# Patient Record
Sex: MALE | Race: WHITE | NOT HISPANIC OR LATINO | URBAN - NONMETROPOLITAN AREA
[De-identification: names, ages, dates, MRNs, and addresses within clinical notes are randomized per-mention and may not be internally consistent; named-entity substitution may affect disease eponyms.]

---

## 2019-06-06 ENCOUNTER — IMPORTED ENCOUNTER (OUTPATIENT)
Dept: URBAN - NONMETROPOLITAN AREA CLINIC 1 | Facility: CLINIC | Age: 34
End: 2019-06-06

## 2019-06-06 PROBLEM — H40.1134: Noted: 2019-06-06

## 2019-06-06 PROBLEM — Z94.7: Noted: 2019-06-06

## 2019-06-06 PROBLEM — H27.02: Noted: 2019-06-06

## 2019-06-06 PROBLEM — H52.4: Noted: 2019-06-06

## 2019-06-06 PROBLEM — Z96.1: Noted: 2019-06-06

## 2019-06-06 PROBLEM — H33.8: Noted: 2019-06-06

## 2019-06-06 PROCEDURE — 99213 OFFICE O/P EST LOW 20 MIN: CPT

## 2019-06-06 PROCEDURE — 92133 CPTRZD OPH DX IMG PST SGM ON: CPT

## 2019-06-06 PROCEDURE — 92083 EXTENDED VISUAL FIELD XM: CPT

## 2019-06-06 NOTE — PATIENT DISCUSSION
Pseudophakia OD - Discussed diagnosis in detail with patient- Patient is stable and doing well with no glare complaint- Patient had cataract removed as a infant- Continue to monitorPOAG OU - Discussed diagnosis in detail with patient- IOP today OD 16 and OS 18- Cup to Disc noted at OD . 6 and OS . 4- OCT done today OD shows Superior and Inferior NFL thinning and OS shows No NFL thinning - VF done today OD shows Fair field wit hInferior and Nasal defect and OS shows Poor field with Superior defect - Continue Cosopt OD BID- Continue Latanoprost QHS OU - Continue to monitorAphakia OS - Discussed diagnosis in detail with patient - Patient is stable at this time - Continue Prednisolone QAM OD - Continue to monitor Partial Corneal Transplant OD - Discussed diagnosis in detail with patient - Partial transplant done by Dr. Shi Huber at [facility] in 2015- Continue to monitor History of Retinal Detachment OD - Discussd diagnosis in detail with patient - 2 Attempts to fix in the past by [facility]- Scleral buckle in 2016- Continue to monitor Myopia OD / Astigmatism OU/ Hyperopia OS / Presbyopia OU- Discussed diagnosis in detail with patient - Patient previously wears a contact in OS- Continue to monitor Patient toshia records today Dr. Deja Oconnor to review records and will update chart at that time with past surgeries Patient will sign a release for My eye doctor records; 's Notes: MR 12/4/18DFE 12/4/18VF 6/6/19OCT 6/6/19

## 2019-12-05 ENCOUNTER — IMPORTED ENCOUNTER (OUTPATIENT)
Dept: URBAN - NONMETROPOLITAN AREA CLINIC 1 | Facility: CLINIC | Age: 34
End: 2019-12-05

## 2019-12-05 PROBLEM — H52.4: Noted: 2019-12-05

## 2019-12-05 PROBLEM — Z94.7: Noted: 2019-12-05

## 2019-12-05 PROBLEM — H27.02: Noted: 2019-12-05

## 2019-12-05 PROBLEM — Z96.1: Noted: 2019-12-05

## 2019-12-05 PROBLEM — H40.1134: Noted: 2019-12-05

## 2019-12-05 PROBLEM — H33.8: Noted: 2019-12-05

## 2019-12-05 PROCEDURE — 92015 DETERMINE REFRACTIVE STATE: CPT

## 2019-12-05 PROCEDURE — 92014 COMPRE OPH EXAM EST PT 1/>: CPT

## 2019-12-05 NOTE — PATIENT DISCUSSION
Myopia OD / Astigmatism OU/ Hyperopia OS / Presbyopia OU- Discussed diagnosis in detail with patient - MR done by me today with CL OS in place- New GLRx given recommended update in lenses- Will not update CL until he needs them as he states he still has plenty of them. Will need to get notes from [facility] before proceeding further. - Continue to monitorPseudophakia OD - Discussed diagnosis in detail with patient- Patient is stable and doing well with no glare complaint- Patient had cataract removed as a infant- Continue to monitorPOAG OU - Discussed diagnosis in detail with patient- IOP today OD 16 and OS 18- Cup to Disc noted at OD 0.6 and OS 0.4- OCT done previously OD shows Superior and Inferior NFL thinning and OS shows No NFL thinning - VF done previously OD shows Fair field wit hInferior and Nasal defect and OS shows Poor field with Superior defect - Continue Cosopt OD BID- Continue Latanoprost QHS OU - Continue to monitorAphakia OS - Discussed diagnosis in detail with patient - Patient is stable at this time - Continue Prednisolone QAM OD - Continue to monitor Partial Corneal Transplant OD - Discussed diagnosis in detail with patient - Partial transplant done by Dr. Arely Miller at [facility] in 2015- Continue to monitor History of Retinal Detachment OD - Discussd diagnosis in detail with patient - 2 Attempts to fix in the past by [facility]- Scleral buckle in 2016- Continue to monitor Attempted to get notes from [facility] after his last visit records release has been signed x2 now. I will attempt to call them myself today.; 's Notes: MR 12/5/19 - MR done with CL OS in place by Dr. Eusebio Santos. It must be done this way each visit.  DFE 12/4/18VF 6/6/19OCT 6/6/19

## 2020-06-18 ENCOUNTER — IMPORTED ENCOUNTER (OUTPATIENT)
Dept: URBAN - NONMETROPOLITAN AREA CLINIC 1 | Facility: CLINIC | Age: 35
End: 2020-06-18

## 2020-06-18 PROBLEM — Z96.1: Noted: 2020-06-18

## 2020-06-18 PROBLEM — H33.8: Noted: 2020-06-18

## 2020-06-18 PROBLEM — H27.02: Noted: 2020-06-18

## 2020-06-18 PROBLEM — H52.4: Noted: 2020-06-18

## 2020-06-18 PROBLEM — Z94.7: Noted: 2020-06-18

## 2020-06-18 PROBLEM — H40.1134: Noted: 2020-06-18

## 2020-06-18 PROCEDURE — 92133 CPTRZD OPH DX IMG PST SGM ON: CPT

## 2020-06-18 PROCEDURE — 92083 EXTENDED VISUAL FIELD XM: CPT

## 2020-06-18 PROCEDURE — 99213 OFFICE O/P EST LOW 20 MIN: CPT

## 2020-06-18 NOTE — PATIENT DISCUSSION
Pseudophakia OD - Discussed diagnosis in detail with patient- Patient is stable and doing well with no glare complaint- Patient had cataract removed as a infant- Continue to monitorPOAG OU - Discussed diagnosis in detail with patient- IOP today 16 OU- Cup to Disc noted at OD 0.6 and OS 0.4- OCT done today OD shows Superior NFL thinning and OS shows No NFL thinning - VF done today OD shows Fair field with Borderline Inferior Nasal Step and OS shows Poor field with Nasal Defect  - Continue Cosopt OD BID- Continue Latanoprost QHS OU - Continue to monitorAphakia OS - Discussed diagnosis in detail with patient - Patient is stable at this time - Continue Prednisolone QAM OD - Continue to monitor Partial Corneal Transplant OD - Discussed diagnosis in detail with patient - Partial transplant done by Dr. Veda Redding at [facility] in 2015- Continue to monitor History of Retinal Detachment OD - Discussd diagnosis in detail with patient - 2 Attempts to fix in the past by [facility], [facility]- Scleral buckle in 2016- Continue to monitor ----------------------------previous notes--------------------------Myopia OD / Astigmatism OU/ Hyperopia OS / Presbyopia OU- Discussed diagnosis in detail with patient - MR done by me today with CL OS in place- New GLRx given recommended update in lenses- Will not update CL until he needs them as he states he still has plenty of them. Will need to get notes from [facility] before proceeding further. - Continue to monitorAttempted to get notes from [facility] after his last visit records release has been signed x2 now. I will attempt to call them myself today.; Dr's Notes: MR 12/5/19 - MR done with CL OS in place by Dr. Stu Barcenas. It must be done this way each visit.  DFE 12/4/18VF 6/18/20OCT 6/18/20

## 2020-11-23 ENCOUNTER — IMPORTED ENCOUNTER (OUTPATIENT)
Dept: URBAN - NONMETROPOLITAN AREA CLINIC 1 | Facility: CLINIC | Age: 35
End: 2020-11-23

## 2020-11-23 PROBLEM — Z94.7: Noted: 2020-11-23

## 2020-11-23 PROBLEM — H33.8: Noted: 2020-11-23

## 2020-11-23 PROBLEM — H52.4: Noted: 2020-11-23

## 2020-11-23 PROBLEM — H27.02: Noted: 2020-11-23

## 2020-11-23 PROBLEM — Z96.1: Noted: 2020-11-23

## 2020-11-23 PROBLEM — H40.1134: Noted: 2020-11-23

## 2020-11-23 PROBLEM — H18.11: Noted: 2020-11-23

## 2020-11-23 PROCEDURE — 99213 OFFICE O/P EST LOW 20 MIN: CPT

## 2020-11-23 NOTE — PATIENT DISCUSSION
NOTE:  Computer system was not working at time of patient's initial evaluation information put into the system @12:39pm once system was up and running properly. Keratitis with Bollus Keratopathy OD:-  Discussed findings w/ pt today-  Hx of corneal transplant and scleral buckle OD-  No foreign body seen on exam today-  Recommended starting Sofie 128 during the day and ointment QHS-  Recommended he increase Prednisolone to BID OD-  RTC 1 week for recheck ---------------- NOTES BELOW FROM LAST VISIT -----------------------------Pseudophakia OD - Discussed diagnosis in detail with patient- Patient is stable and doing well with no glare complaint- Patient had cataract removed as a infant- Continue to monitorPOAG OU - Discussed diagnosis in detail with patient- IOP today 16 OU- Cup to Disc noted at OD 0.6 and OS 0.4- OCT done today OD shows Superior NFL thinning and OS shows No NFL thinning - VF done today OD shows Fair field with Borderline Inferior Nasal Step and OS shows Poor field with Nasal Defect  - Continue Cosopt OD BID- Continue Latanoprost QHS OU - Continue to monitorAphakia OS - Discussed diagnosis in detail with patient - Patient is stable at this time - Continue Prednisolone QAM OD - Continue to monitor Partial Corneal Transplant OD - Discussed diagnosis in detail with patient - Partial transplant done by Dr. Bay Juan at [facility] in 2015- Continue to monitor History of Retinal Detachment OD - Discussd diagnosis in detail with patient - 2 Attempts to fix in the past by [facility], [facility]- Scleral buckle in 2016- Continue to monitor Myopia OD / Astigmatism OU/ Hyperopia OS / Presbyopia OU- Discussed diagnosis in detail with patient previously- New GLRx given in the past needs to be updated once keratopathy is better controlled - Will not update CL until he needs them as he states he still has plenty of them. Will need to get notes from [facility] if not so already. - Continue to monitorNOTE:  Attempted to get notes from [facility] after his last visit records release has been signed x2 now. I will attempt to call them myself today.; 's Notes: MR 12/5/19 - MR done with CL OS in place by Dr. Navneet Oliveira. It must be done this way each visit.  DFE 12/4/18VF 6/18/20OCT 6/18/20

## 2020-11-30 ENCOUNTER — IMPORTED ENCOUNTER (OUTPATIENT)
Dept: URBAN - NONMETROPOLITAN AREA CLINIC 1 | Facility: CLINIC | Age: 35
End: 2020-11-30

## 2020-11-30 PROCEDURE — 99213 OFFICE O/P EST LOW 20 MIN: CPT

## 2020-11-30 NOTE — PATIENT DISCUSSION
Keratitis with Bollus Keratopathy OD:-  Discussed findings w/ pt today-  Signs/symptoms associated discussed-  Hx of corneal transplant and scleral buckle OD-  Microcystic edema seen worse than last exam-  Note that generic Cosopt bottle has changed per patient unsure if same  as previous. Sample of Cosopt PF given today for patient to use and d/c generic Cosopt for now. -  Due to changes/worsening Increase Prednisolone OD from BID to QID. -  Continue Sofie 128 ointment during the day d/c ointment QHS-  Start E-mycin ointment QHS Rx will be sent-  Recommend evaluation with Dr. Suresh Schroeder soon especially if he is not better by next visit. -  RTC 1 week for recheck ---------------- NOTES BELOW FROM PREVIOUS VISIT -----------------------------Pseudophakia OD - Discussed diagnosis in detail with patient- Patient is stable and doing well with no glare complaint- Patient had cataract removed as a infant- Continue to monitorPOAG OU - Discussed diagnosis in detail with patient- IOP today 16 OU- Cup to Disc noted at OD 0.6 and OS 0.4- OCT done today OD shows Superior NFL thinning and OS shows No NFL thinning - VF done today OD shows Fair field with Borderline Inferior Nasal Step and OS shows Poor field with Nasal Defect  - Continue Cosopt OD BID- Continue Latanoprost QHS OU - Continue to monitorAphakia OS - Discussed diagnosis in detail with patient - Patient is stable at this time - Continue Prednisolone QAM OD - Continue to monitor Partial Corneal Transplant OD - Discussed diagnosis in detail with patient - Partial transplant done by Dr. Suresh Schroeder at [facility] in 2015- Continue to monitor History of Retinal Detachment OD - Discussd diagnosis in detail with patient - 2 Attempts to fix in the past by [facility], [facility]- Scleral buckle in 2016- Continue to monitor Myopia OD / Astigmatism OU/ Hyperopia OS / Presbyopia OU- Discussed diagnosis in detail with patient previously- New GLRx given in the past needs to be updated once keratopathy is better controlled - Will not update CL until he needs them as he states he still has plenty of them. Will need to get notes from [facility] if not so already. - Continue to monitorNOTE:  Attempted to get notes from [facility] after his last visit records release has been signed x2 now. I will attempt to call them myself today.; Dr's Notes: MR 12/5/19 - MR done with CL OS in place by Dr. Luis Bardales. It <br />must be done this way each visit.  <br />DFE 12/4/18<br />VF 6/18/20<br />OCT 6/18/20<br />

## 2021-02-05 ENCOUNTER — IMPORTED ENCOUNTER (OUTPATIENT)
Dept: URBAN - NONMETROPOLITAN AREA CLINIC 1 | Facility: CLINIC | Age: 36
End: 2021-02-05

## 2021-02-05 PROBLEM — H33.8: Noted: 2021-02-05

## 2021-02-05 PROBLEM — H40.1134: Noted: 2021-02-05

## 2021-02-05 PROBLEM — Z96.1: Noted: 2021-02-05

## 2021-02-05 PROBLEM — H18.11: Noted: 2021-02-05

## 2021-02-05 PROBLEM — H27.02: Noted: 2021-02-05

## 2021-02-05 PROBLEM — Z94.7: Noted: 2021-02-05

## 2021-02-05 PROBLEM — H18.831: Noted: 2021-02-05

## 2021-02-05 PROCEDURE — 99213 OFFICE O/P EST LOW 20 MIN: CPT

## 2021-02-05 NOTE — PATIENT DISCUSSION
Epith defect/erosion OD- Discussed diagnosis in detail with patient.- Start Besivance QID OD samples given today- Recommend BCL OD today; Dailies Total 1 +0.50 given today. - Continue to monitor.- F/U Monday NOTES FROM PREVIOUS EXAM:Keratitis with Bollus Keratopathy OD:-  Discussed findings w/ pt today-  Signs/symptoms associated discussed-  Hx of corneal transplant and scleral buckle OD-  Microcystic edema seen worse than last exam-  Note that generic Cosopt bottle has changed per patient unsure if same  as previous. Sample of Cosopt PF given today for patient to use and d/c generic Cosopt for now. -  Due to changes/worsening Increase Prednisolone OD from BID to QID. -  Continue Sofie 128 ointment during the day d/c ointment QHS-  Start E-mycin ointment QHS Rx will be sent-  Recommend evaluation with Dr. Malinda Bridges soon especially if he is not better by next visit. -  RTC 1 week for recheck Pseudophakia OD - Discussed diagnosis in detail with patient- Patient is stable and doing well with no glare complaint- Patient had cataract removed as a infant- Continue to monitorPOAG OU - Discussed diagnosis in detail with patient- IOP today 16 OU- Cup to Disc noted at OD 0.6 and OS 0.4- OCT done today OD shows Superior NFL thinning and OS shows No NFL thinning - VF done today OD shows Fair field with Borderline Inferior Nasal Step and OS shows Poor field with Nasal Defect  - Continue Cosopt OD BID- Continue Latanoprost QHS OU - Continue to monitorAphakia OS - Discussed diagnosis in detail with patient - Patient is stable at this time - Continue Prednisolone QAM OD - Continue to monitor Partial Corneal Transplant OD - Discussed diagnosis in detail with patient - Partial transplant done by Dr. Malinda Bridges at [facility] in 2015- Continue to monitor History of Retinal Detachment OD - Discussd diagnosis in detail with patient - 2 Attempts to fix in the past by [facility], [facility]- Scleral buckle in 2016- Continue to monitor Myopia OD / Astigmatism OU/ Hyperopia OS / Presbyopia OU- Discussed diagnosis in detail with patient previously- New GLRx given in the past needs to be updated once keratopathy is better controlled - Will not update CL until he needs them as he states he still has plenty of them. Will need to get notes from [facility] if not so already. - Continue to monitorNOTE:  Attempted to get notes from [facility] after his last visit records release has been signed x2 now. I will attempt to call them myself today.; 's Notes: MR 12/5/19 - MR done with CL OS in place by Dr. Wilber Durant. It must be done this way each visit.  DFE 12/4/18VF 6/18/20OCT 6/18/20

## 2021-02-08 ENCOUNTER — IMPORTED ENCOUNTER (OUTPATIENT)
Dept: URBAN - NONMETROPOLITAN AREA CLINIC 1 | Facility: CLINIC | Age: 36
End: 2021-02-08

## 2021-02-08 PROCEDURE — 99213 OFFICE O/P EST LOW 20 MIN: CPT

## 2021-02-08 NOTE — PATIENT DISCUSSION
Epith defect/erosion OD - Discussed diagnosis in detail with patient. - Now resolved- D/C Besivance QID OD- Start Sofie alyssa QHS OD- Continue to monitor.- A/S with Dr. Shara Landin EXAM:Keratitis with Bollus Keratopathy OD:-  Discussed findings w/ pt today-  Signs/symptoms associated discussed-  Hx of corneal transplant and scleral buckle OD-  Microcystic edema seen worse than last exam-  Note that generic Cosopt bottle has changed per patient unsure if same  as previous. Sample of Cosopt PF given today for patient to use and d/c generic Cosopt for now. -  Due to changes/worsening Increase Prednisolone OD from BID to QID. -  Continue Sofie 128 ointment during the day d/c ointment QHS-  Start E-mycin ointment QHS Rx will be sent-  Recommend evaluation with Dr. Robles Perez soon especially if he is not better by next visit. -  RTC 1 week for recheck Pseudophakia OD - Discussed diagnosis in detail with patient- Patient is stable and doing well with no glare complaint- Patient had cataract removed as a infant- Continue to monitorPOAG OU - Discussed diagnosis in detail with patient- IOP today 16 OU- Cup to Disc noted at OD 0.6 and OS 0.4- OCT done today OD shows Superior NFL thinning and OS shows No NFL thinning - VF done today OD shows Fair field with Borderline Inferior Nasal Step and OS shows Poor field with Nasal Defect  - Continue Cosopt OD BID- Continue Latanoprost QHS OU - Continue to monitorAphakia OS - Discussed diagnosis in detail with patient - Patient is stable at this time - Continue Prednisolone QAM OD - Continue to monitor Partial Corneal Transplant OD - Discussed diagnosis in detail with patient - Partial transplant done by Dr. Robles Perez at [facility] in 2015- Continue to monitor History of Retinal Detachment OD - Discussd diagnosis in detail with patient - 2 Attempts to fix in the past by [facility]- Scleral buckle in 2016- Continue to monitor Myopia OD / Astigmatism OU/ Hyperopia OS / Presbyopia OU- Discussed diagnosis in detail with patient previously- New GLRx given in the past needs to be updated once keratopathy is better controlled - Will not update CL until he needs them as he states he still has plenty of them. Will need to get notes from [facility] if not so already. - Continue to monitorNOTE:  Attempted to get notes from [facility] after his last visit records release has been signed x2 now. I will attempt to call them myself today.; 's Notes: MR 12/5/19 - MR done with CL OS in place by Dr. Blane Mustafa. It must be done this way each visit.  DFE 12/4/18VF 6/18/20OCT 6/18/20

## 2021-03-03 ENCOUNTER — IMPORTED ENCOUNTER (OUTPATIENT)
Dept: URBAN - NONMETROPOLITAN AREA CLINIC 1 | Facility: CLINIC | Age: 36
End: 2021-03-03

## 2021-03-03 PROCEDURE — 92310 CONTACT LENS FITTING OU: CPT

## 2021-03-03 NOTE — PATIENT DISCUSSION
Epith defect/erosion OD - Discussed diagnosis in detail with patient. - Now resolved- D/C Besivance QID OD- Start Sofie alyssa QHS OD- Continue to monitor.- A/S with Dr. Juarez Man EXAM:Keratitis with Bollus Keratopathy OD:-  Discussed findings w/ pt today-  Signs/symptoms associated discussed-  Hx of corneal transplant and scleral buckle OD-  Microcystic edema seen worse than last exam-  Note that generic Cosopt bottle has changed per patient unsure if same  as previous. Sample of Cosopt PF given today for patient to use and d/c generic Cosopt for now. -  Due to changes/worsening Increase Prednisolone OD from BID to QID. -  Continue Sofie 128 ointment during the day d/c ointment QHS-  Start E-mycin ointment QHS Rx will be sent-  Recommend evaluation with Dr. Shi Huber soon especially if he is not better by next visit. -  RTC 1 week for recheck Pseudophakia OD - Discussed diagnosis in detail with patient- Patient is stable and doing well with no glare complaint- Patient had cataract removed as a infant- Continue to monitorPOAG OU - Discussed diagnosis in detail with patient- IOP today 16 OU- Cup to Disc noted at OD 0.6 and OS 0.4- OCT done today OD shows Superior NFL thinning and OS shows No NFL thinning - VF done today OD shows Fair field with Borderline Inferior Nasal Step and OS shows Poor field with Nasal Defect  - Continue Cosopt OD BID- Continue Latanoprost QHS OU - Continue to monitorAphakia OS - Discussed diagnosis in detail with patient - Patient is stable at this time - Continue Prednisolone QAM OD - Continue to monitor Partial Corneal Transplant OD - Discussed diagnosis in detail with patient - Partial transplant done by Dr. Shi Huebr at [facility] in 2015- Continue to monitor History of Retinal Detachment OD - Discussd diagnosis in detail with patient - 2 Attempts to fix in the past by [facility]- Scleral buckle in 2016- Continue to monitor Myopia OD / Astigmatism OU/ Hyperopia OS / Presbyopia OU- Discussed diagnosis in detail with patient previously- New GLRx given in the past needs to be updated once keratopathy is better controlled - Will not update CL until he needs them as he states he still has plenty of them. Will need to get notes from [facility] if not so already. - Continue to monitorNOTE:  Attempted to get notes from [facility] after his last visit records release has been signed x2 now. I will attempt to call them myself today.; 's Notes: MR 12/5/19 - MR done with CL OS in place by Dr. Deja Oconnor. It must be done this way each visit.  DFE 12/4/18VF 6/18/20OCT 6/18/20

## 2021-06-07 ENCOUNTER — IMPORTED ENCOUNTER (OUTPATIENT)
Dept: URBAN - NONMETROPOLITAN AREA CLINIC 1 | Facility: CLINIC | Age: 36
End: 2021-06-07

## 2021-06-07 PROBLEM — H33.8: Noted: 2021-02-05

## 2021-06-07 PROBLEM — H18.831: Noted: 2021-06-07

## 2021-06-07 PROBLEM — Z96.1: Noted: 2021-02-05

## 2021-06-07 PROBLEM — H40.1134: Noted: 2021-06-07

## 2021-06-07 PROBLEM — Z94.7: Noted: 2021-06-07

## 2021-06-07 PROBLEM — H18.11: Noted: 2021-06-07

## 2021-06-07 PROBLEM — H27.02: Noted: 2021-02-05

## 2021-06-07 PROCEDURE — 92133 CPTRZD OPH DX IMG PST SGM ON: CPT

## 2021-06-07 PROCEDURE — 92083 EXTENDED VISUAL FIELD XM: CPT

## 2021-06-07 PROCEDURE — 99214 OFFICE O/P EST MOD 30 MIN: CPT

## 2021-06-07 PROCEDURE — 92015 DETERMINE REFRACTIVE STATE: CPT

## 2021-06-07 NOTE — PATIENT DISCUSSION
POAG OU - Discussed diagnosis in detail with patient- IOP today 16 OU stable- Cup to Disc noted at OD 0.6 and OS 0.4- OCT done today: OD shows Superior NFL thinning and OS shows No NFL thinning stable OU from 2020.- VF done today:  OD shows Fair field with Borderline Inferior Nasal Step and OS shows good field with Nasal Defect stable OU. - Continue Cosopt PF OD BID- Continue Latanoprost QHS OU - Continue to monitor closely for changes- RTC 6 months w/ Optos GonioHx of Epith defect/erosion OD - Discussed diagnosis in detail with patient. - Now resolved monitor for recurrence PRN- Continue to monitor PRN/per Dr. Brandie Mercado with Bollus Keratopathy OD:-  Discussed findings w/ pt today-  Signs/symptoms associated discussed-  Hx of corneal transplant and scleral buckle OD-  Microcystic edema seen worse than last exam-  Note that generic Cosopt bottle has changed per patient unsure if same  as previous. Sample of Cosopt PF given today for patient to use and d/c generic Cosopt for now. -  Treated with Sofie and E-mycin ointment-  Continue Prednisolone OD BID per Dr. Koehler Ser commendation(s). Pseudophakia OD - Discussed diagnosis in detail with patient- Patient is stable and doing well with no glare complaint- Patient had cataract removed as a infant- Continue to monitorAphakia OS - Discussed diagnosis in detail with patient - Patient is stable at this time - Continue Prednisolone QAM OD - Continue to monitor Partial Corneal Transplant OD - Discussed diagnosis in detail with patient - Partial transplant done by Dr. Malinda Bridges at [facility] in 2015- Continue to monitor History of Retinal Detachment OD - Discussd diagnosis in detail with patient - 2 Attempts to fix in the past by [facility], [facility]- Scleral buckle in 2016- Continue to monitor Myopia OD / Astigmatism OU/ Hyperopia OS / Presbyopia OU- Discussed diagnosis in detail with patient previously- Repeated MR today by me new GLRx given. - Last GLRx was given by Dr. Jose Powell with prisim. Reviewed notes from 2016 with patient today. - Continue with same CLRx unless problems arise. - Continue to monitor; Dr's Notes: MR 12/5/19 - MR done with CL OS in place by Dr. Wilber Durant. It must be done this way each visit - with prisim. MARZENAE 12/4/18VF 6/7/2021OCT 6/7/2021[place]

## 2021-12-08 ENCOUNTER — IMPORTED ENCOUNTER (OUTPATIENT)
Dept: URBAN - NONMETROPOLITAN AREA CLINIC 1 | Facility: CLINIC | Age: 36
End: 2021-12-08

## 2021-12-08 PROCEDURE — 99213 OFFICE O/P EST LOW 20 MIN: CPT

## 2021-12-08 PROCEDURE — 92250 FUNDUS PHOTOGRAPHY W/I&R: CPT

## 2021-12-08 NOTE — PATIENT DISCUSSION
POAG OU - Discussed diagnosis in detail with patient- IOP today 16 OU stable- Cup to Disc noted at OD 0.6 and OS 0.4- OCT done previously: OD shows Superior NFL thinning and OS shows No NFL thinning stable OU from 2020.- VF done previously OD shows Fair field with Borderline Inferior Nasal Step and OS shows good field with Nasal Defect stable OU. - Optos done today stable OU- Continue Cosopt PF OD BID- Continue Latanoprost QHS OU - Continue to monitor Keratitis with Bollus Keratopathy OD:-  Discussed findings w/ pt today-  Signs/symptoms associated discussed-  Hx of corneal transplant and scleral buckle OD-  Min microcystic edema seen improved from last exam-  Note that generic Cosopt bottle has changed per patient unsure if same  as previous. Sample of Cosopt PF given today for patient to use and d/c generic Cosopt for now. -  Treated with Sofie and E-mycin ointment-  Continue Prednisolone OD QD per Dr. Mally Magaña commendation(s). -  Continue to monitor Pseudophakia OD - Discussed diagnosis in detail with patient- Patient is stable and doing well with no glare complaint- Patient had cataract removed as a infant- Continue to monitorAphakia OS - Discussed diagnosis in detail with patient - Patient is stable at this time - Continue Prednisolone QAM OD - Continue to monitor Partial Corneal Transplant OD - Discussed diagnosis in detail with patient - Partial transplant done by Dr. Noy Pena at [facility] in 2015- Continue to monitor History of Retinal Detachment OD - Discussd diagnosis in detail with patient - 2 Attempts to fix in the past by [facility]- Scleral buckle in 2016- Continue to monitor Myopia OD / Astigmatism OU/ Hyperopia OS / Presbyopia OU- Discussed diagnosis in detail with patient previously- Continue to monitor; Dr's Notes: MR 12/5/19 - MR done with CL OS in place by Dr. Kaelyn Palmer. It must be done this way each visit - with bryan. DFE 12/4/18VF 6/7/2021OCT 6/7/2021Optos 12/8/21[place]

## 2022-03-09 ENCOUNTER — FOLLOW UP (OUTPATIENT)
Dept: URBAN - NONMETROPOLITAN AREA CLINIC 1 | Facility: CLINIC | Age: 37
End: 2022-03-09

## 2022-03-09 DIAGNOSIS — H40.1134: ICD-10-CM

## 2022-03-09 DIAGNOSIS — H52.4: ICD-10-CM

## 2022-03-09 PROCEDURE — 99213 OFFICE O/P EST LOW 20 MIN: CPT

## 2022-03-09 PROCEDURE — 92310 CONTACT LENS FITTING OU: CPT

## 2022-03-09 PROCEDURE — 92015 DETERMINE REFRACTIVE STATE: CPT

## 2022-03-09 ASSESSMENT — KERATOMETRY
OS_AXISANGLE_DEGREES: 160
OS_K1POWER_DIOPTERS: 45.00
OS_K2POWER_DIOPTERS: 46.00
OD_K1POWER_DIOPTERS: 44.50
OD_AXISANGLE_DEGREES: 153
OS_AXISANGLE2_DEGREES: 70
OD_AXISANGLE2_DEGREES: 63
OD_K2POWER_DIOPTERS: 45.25

## 2022-03-09 ASSESSMENT — VISUAL ACUITY: OD_CC: 20/29-2

## 2022-03-09 NOTE — PATIENT DISCUSSION
Discussed diagnosis in detail with patient. Appears stable on exam today. Continue current treatment regimen. Continue Latanoprost QHS OU and Cosopt PF BID OU.  Continue to monitor every few months with testing as directed.

## 2022-04-16 ASSESSMENT — VISUAL ACUITY
OD_SC: 20/40
OD_PH: 20/30-
OD_SC: 20/40
OS_SC: 20/25-2
OD_SC: 20/40
OD_SC: 20/40-2
OD_SC: 20/40+
OS_SC: 20/30-2 W/ CL
OS_SC: 20/25-
OD_SC: 20/50+2
OS_SC: 20/30-2 W/ CL
OS_CC: 20/25
OD_SC: 20/40-
OD_PH: 20/30-
OS_SC: 20/30-2 W/ CL
OS_SC: 20/30-2 W/ CL
OD_PH: 20/30
OD_PH: 20/32-
OD_PH: 20/32-
OD_SC: 20/40
OD_SC: 20/40-
OD_PH: 20/32-
OD_SC: 20/40

## 2022-04-16 ASSESSMENT — TONOMETRY
OD_IOP_MMHG: 16
OD_IOP_MMHG: 12
OS_IOP_MMHG: 17
OS_IOP_MMHG: 18
OD_IOP_MMHG: 17
OS_IOP_MMHG: 16
OD_IOP_MMHG: 16
OS_IOP_MMHG: 19
OS_IOP_MMHG: 16
OD_IOP_MMHG: 16
OS_IOP_MMHG: 16
OS_IOP_MMHG: 16
OD_IOP_MMHG: 13
OD_IOP_MMHG: 16
OS_IOP_MMHG: 16
OD_IOP_MMHG: 16

## 2022-09-12 ENCOUNTER — FOLLOW UP (OUTPATIENT)
Dept: URBAN - NONMETROPOLITAN AREA CLINIC 1 | Facility: CLINIC | Age: 37
End: 2022-09-12

## 2022-09-12 DIAGNOSIS — H40.1132: ICD-10-CM

## 2022-09-12 PROCEDURE — 92250 FUNDUS PHOTOGRAPHY W/I&R: CPT

## 2022-09-12 PROCEDURE — 92133 CPTRZD OPH DX IMG PST SGM ON: CPT

## 2022-09-12 PROCEDURE — 92083 EXTENDED VISUAL FIELD XM: CPT

## 2022-09-12 PROCEDURE — 99213 OFFICE O/P EST LOW 20 MIN: CPT

## 2022-09-12 RX ORDER — LATANOPROST 50 UG/ML: 1 SOLUTION/ DROPS OPHTHALMIC EVERY EVENING

## 2022-09-12 ASSESSMENT — KERATOMETRY
OD_AXISANGLE2_DEGREES: 63
OS_K2POWER_DIOPTERS: 46.00
OS_AXISANGLE2_DEGREES: 70
OS_K1POWER_DIOPTERS: 45.00
OD_AXISANGLE_DEGREES: 153
OS_AXISANGLE_DEGREES: 160
OD_K2POWER_DIOPTERS: 45.25
OD_K1POWER_DIOPTERS: 44.50

## 2022-09-12 ASSESSMENT — VISUAL ACUITY
OU_CC: 20/25-2
OD_PH: 20/40+1

## 2022-09-12 ASSESSMENT — TONOMETRY
OS_IOP_MMHG: 14
OD_IOP_MMHG: 13

## 2024-12-03 ENCOUNTER — COMPREHENSIVE EXAM (OUTPATIENT)
Age: 39
End: 2024-12-03

## 2024-12-03 DIAGNOSIS — H52.4: ICD-10-CM

## 2024-12-03 PROCEDURE — 92310-1 LEVEL 1 SOFT LENS UPDATE

## 2024-12-03 PROCEDURE — 92015 DETERMINE REFRACTIVE STATE: CPT

## 2024-12-03 PROCEDURE — 92014 COMPRE OPH EXAM EST PT 1/>: CPT
